# Patient Record
Sex: MALE | Race: WHITE | Employment: OTHER | URBAN - METROPOLITAN AREA
[De-identification: names, ages, dates, MRNs, and addresses within clinical notes are randomized per-mention and may not be internally consistent; named-entity substitution may affect disease eponyms.]

---

## 2017-10-13 ENCOUNTER — HOSPITAL ENCOUNTER (OUTPATIENT)
Dept: LAB | Age: 82
Discharge: HOME | End: 2017-10-13
Payer: MEDICARE

## 2017-10-13 ENCOUNTER — CLINICAL SUPPORT (OUTPATIENT)
Dept: CARDIOLOGY CLINIC | Age: 82
End: 2017-10-13

## 2017-10-13 ENCOUNTER — OFFICE VISIT (OUTPATIENT)
Dept: CARDIOLOGY CLINIC | Age: 82
End: 2017-10-13

## 2017-10-13 VITALS
HEIGHT: 66 IN | WEIGHT: 180 LBS | SYSTOLIC BLOOD PRESSURE: 128 MMHG | BODY MASS INDEX: 28.93 KG/M2 | HEART RATE: 72 BPM | DIASTOLIC BLOOD PRESSURE: 88 MMHG

## 2017-10-13 DIAGNOSIS — I47.29 PAROXYSMAL VENTRICULAR TACHYCARDIA: ICD-10-CM

## 2017-10-13 DIAGNOSIS — E78.2 MIXED HYPERLIPIDEMIA: ICD-10-CM

## 2017-10-13 DIAGNOSIS — I71.21 ASCENDING AORTIC ANEURYSM: Primary | ICD-10-CM

## 2017-10-13 DIAGNOSIS — I77.810 DILATED AORTIC ROOT (HCC): Primary | ICD-10-CM

## 2017-10-13 DIAGNOSIS — I25.10 CORONARY ARTERY DISEASE INVOLVING NATIVE CORONARY ARTERY OF NATIVE HEART WITHOUT ANGINA PECTORIS: ICD-10-CM

## 2017-10-13 PROCEDURE — 80048 BASIC METABOLIC PNL TOTAL CA: CPT

## 2017-10-13 PROCEDURE — 36415 COLL VENOUS BLD VENIPUNCTURE: CPT

## 2017-10-13 RX ORDER — TRAZODONE HYDROCHLORIDE 50 MG/1
TABLET ORAL
COMMUNITY

## 2017-10-13 NOTE — MR AVS SNAPSHOT
Visit Information Date & Time Provider Department Dept. Phone Encounter #  
 10/13/2017  4:00 PM Kylie Espinal MD [facility] Kacey Gallardo [phone] [id number] Follow-up Instructions Return in about 1 year (around 10/13/2018). Your Appointments 10/13/2017  4:00 PM  
ESTABLISHED PATIENT with Kylie Espinal MD  
[facility] ([facility]) Appt Note: Per Dr. Natalia Reyes Echo @1 dx aortic aneurysm, See Latanya Mosley after pts wife r/s from 08/11 to 08/22. .. pt's wife r/s frrom 8/22 to 9/5 pts wife rs'd 9/5 to 9/29 kmr r/s from 9/29; Per Dr. Natalia Cavazos @6 dx aortic aneurysm, See Latanya Melany after pts wife r/s from 08/11 to 08/22. .. pt's wife r/s frrom 8/22 to 9/5 pts wife rs'd 9/5 to 9/29 kmr r/s from 9/29 wife r/s from 10/10  
 [facility] [address] [place] [address] [address] [phone] Upcoming Health Maintenance Date Due DTaP/Tdap/Td series (1 - Tdap) 2/28/1957 ZOSTER VACCINE AGE 60> 12/28/1995 GLAUCOMA SCREENING Q2Y 2/28/2001 Pneumococcal 65+ Low/Medium Risk (1 of 2 - PCV13) 2/28/2001 MEDICARE YEARLY EXAM 2/28/2001 INFLUENZA AGE 9 TO ADULT 8/1/2017 Allergies as of 10/13/2017  Review Complete On: 10/13/2017 By: Kylie Espinal MD  
  
 Severity Noted Reaction Type Reactions Sulfa (Sulfonamide Antibiotics)  09/28/2010   Side Effect Unknown (comments) Does not remember Current Immunizations  Never Reviewed No immunizations on file. Not reviewed this visit Vitals BP Pulse Height(growth percentile) Weight(growth percentile) BMI Smoking Status 128/88 72 5' 5.5\" (1.664 m) 180 lb (81.6 kg) 29.5 kg/m2 Never Smoker Vitals History BMI and BSA Data Body Mass Index Body Surface Area  
 29.5 kg/m 2 1.94 m 2 Preferred Pharmacy Pharmacy Name Phone [phone] Your Updated Medication List  
  
   
This list is accurate as of: 10/13/17  3:38 PM.  Always use your most recent med list.  
  
  
  
  
 acetaminophen 325 mg tablet Commonly known as:  TYLENOL Take  by mouth as needed for Pain. allopurinol 300 mg tablet Commonly known as:  Karlene Bolus Take 300 mg by mouth two (2) times a day. amLODIPine 5 mg tablet Commonly known as:  Horris Bills Take 5 mg by mouth daily. aspirin 81 mg tablet Take  by mouth daily. LIPITOR 20 mg tablet Generic drug:  atorvastatin Take  by mouth daily. LOTENSIN 40 mg tablet Generic drug:  benazepril Take 40 mg by mouth daily. metoprolol succinate 25 mg XL tablet Commonly known as:  TOPROL XL Take 1 Tab by mouth daily. PriLOSEC 20 mg capsule Generic drug:  omeprazole Take 20 mg by mouth daily. traZODone 50 mg tablet Commonly known as:  Madolyn Saas Take  by mouth nightly. warfarin 5 mg tablet Commonly known as:  COUMADIN Take 5 mg by mouth daily. Per Dr. Maykel Beebe office Follow-up Instructions Return in about 1 year (around 10/13/2018). Introducing [facility]! Yani Mortensen introduces [facility] patient portal. Now you can access parts of your medical record, email your doctor's office, and request medication refills online. 1. In your internet browser, go to [email or web address] 2. Click on the First Time User? Click Here link in the Sign In box. You will see the New Member Sign Up page. 3. Enter your [facility] Access Code exactly as it appears below. You will not need to use this code after youve completed the sign-up process. If you do not sign up before the expiration date, you must request a new code. · [facility] Access Code: [email or web address] Expires: 1/11/2018  3:38 PM 
 
4.  Enter the last four digits of your Social Security Number (xxxx) and Date of Birth (mm/dd/yyyy) as indicated and click Submit. You will be taken to the next sign-up page. 5. Create a Pharmacopeia ID. This will be your Pharmacopeia login ID and cannot be changed, so think of one that is secure and easy to remember. 6. Create a [employer]ia password. You can change your password at any time. 7. Enter your Password Reset Question and Answer. This can be used at a later time if you forget your password. 8. Enter your e-mail address. You will receive e-mail notification when new information is available in [address] 9. Click Sign Up. You can now view and download portions of your medical record. 10. Click the Download Summary menu link to download a portable copy of your medical information. If you have questions, please visit the Frequently Asked Questions section of the Pharmacopeia website. Remember, [employer]ia is NOT to be used for urgent needs. For medical emergencies, dial 911. Now available from your iPhone and Android! Please provide this summary of care documentation to your next provider. Your primary care clinician is listed as Heidi Neumann. If you have any questions after today's visit, please call [phone].

## 2017-10-13 NOTE — PROGRESS NOTES
HISTORY OF PRESENT ILLNESS  Hermelinda Pelaez is a 81 y.o. male     SUMMARY:   Problem List  Date Reviewed: 10/13/2017          Codes Class Noted    CAD (coronary artery disease), native coronary artery ICD-10-CM: I25.10  ICD-9-CM: 414.01  5/1/2011    Overview Addendum 5/1/2011  7:09 PM by Fifi Boyd MD     Mr. Robin Mora presented in transfer from a [facility] in September, 2000, with exercise induced ventricular tachycardia and abnormal stress test. He underwent cardiac catheterization which showed significant disease involving a small obtuse marginal branch that was not amenable to angioplasty or surgery. The right coronary had a 50% mid-stenosis in it and the LAD was okay. Overall ejection fraction was 50% with mild anterolateral hypokinesis. He subsequently underwent electrophysiologic testing on 9/13/00 that showed some inducible atrial flutter but no ventricular arrhythmias. 3/10 echocardiogram at [facility] normal lv size and function mild tr aortic root 4.2cm             Mixed hyperlipidemia ICD-10-CM: E78.2  ICD-9-CM: 272.2  9/28/2010        Paroxysmal ventricular tachycardia (HCC) ICD-10-CM: I47.2  ICD-9-CM: 427.1  9/28/2010        Abdominal aneurysm without mention of rupture ICD-10-CM: I71.4  ICD-9-CM: 441.4  9/28/2010              Current Outpatient Prescriptions on File Prior to Visit   Medication Sig    metoprolol succinate (TOPROL XL) 25 mg XL tablet Take 1 Tab by mouth daily.  acetaminophen (TYLENOL) 325 mg tablet Take  by mouth as needed for Pain.  atorvastatin (LIPITOR) 20 mg tablet Take  by mouth daily.  amlodipine (NORVASC) 5 mg tablet Take 5 mg by mouth daily.  aspirin 81 mg tablet Take  by mouth daily.  omeprazole (PRILOSEC) 20 mg capsule Take 20 mg by mouth daily.  warfarin (COUMADIN) 5 mg tablet Take 5 mg by mouth daily. Per Dr. Richard Ocampo office     allopurinol (ZYLOPRIM) 300 mg tablet Take 300 mg by mouth two (2) times a day.     benazepril (LOTENSIN) 40 mg tablet Take 40 mg by mouth daily. No current facility-administered medications on file prior to visit. CARDIOLOGY STUDIES TO DATE:  3/10 ct scan 4.4cm ascending aortic anneursym   5/11 echo ascending aorta 4.6cm  6/13 echo ascending aorta 4.4cm  8/14 echo normal lvef, 4.5cm aortic anneursym  8/15 echo, 4.4 cm ascending aortic anneursym  8/16 echo, 4.3 cm ascending aortic anneursym, trace ai, normal lvef    10/17 echo, 4.4 cm at sinuses, 4.8cm ascending aortic  normal lvef      Chief Complaint   Patient presents with    Coronary Artery Disease     HPI :  Mr. Camille Watson has really slowed down. He is walking with a cane. He planted no garden this year and his wife says that he is basically doing nothing but sitting in a chair and walking around the house from time to time during the day. He is not complaining of any worrisome symptoms that sound cardiac and he cannot explain to me why he is not interested in doing things any longer. There has been a significant change in his aorta this year based on the echocardiogram.        CARDIAC ROS:   negative for chest pain, dyspnea, palpitations, syncope, orthopnea, paroxysmal nocturnal dyspnea, exertional chest pressure/discomfort, claudication, lower extremity edema    Family History   Problem Relation Age of Onset    Stroke Mother     Heart Disease Brother     Cancer Maternal Grandmother     Parkinsonism Neg Hx        Past Medical History:   Diagnosis Date    Abdominal aneurysm without mention of rupture 9/28/2010    Aneurysm ([address]     heart    DVT (deep venous thrombosis) (HCC)     Hearing reduced     Hypertension     TIA (transient ischemic attack)     4 mini strokes less than 2 weeks apart       GENERAL ROS:  A comprehensive review of systems was negative except for that written in the HPI.     Visit Vitals    /88    Pulse 72    Ht 5' 5.5\" (1.664 m)    Wt 180 lb (81.6 kg)    BMI 29.5 kg/m2       Wt Readings from Last 3 Encounters:   10/13/17 180 lb (81.6 kg)   08/11/16 189 lb (85.7 kg)   08/14/15 189 lb (85.7 kg)            BP Readings from Last 3 Encounters:   10/13/17 128/88   08/11/16 134/90   08/14/15 110/70       PHYSICAL EXAM  General appearance: alert, cooperative, no distress, appears stated age  Neck: supple, symmetrical, trachea midline, no adenopathy, no carotid bruit and no JVD  Lungs: clear to auscultation bilaterally  Heart: regular rate and rhythm, S1, S2 normal, no murmur, click, rub or gallop  Extremities: extremities normal, atraumatic, no cyanosis or edema      ASSESSMENT  We had a long talk about the change in the size of the aorta and whether or not he would even consider having aortic surgery if it was clinically indicated. Neither he nor his wife were sure about this. I think we should get a CT angiogram at this point to further define what is going on and see if we really need to consider this. Based on the way he has progressed over the last year or so, I am not sure he would tolerate such a major operation. current treatment plan is effective, no change in therapy  lab results and schedule of future lab studies reviewed with patient  reviewed diet, exercise and weight control    No diagnosis found. Orders Placed This Encounter    traZODone (DESYREL) 50 mg tablet       Follow-up Disposition:  Return in about 1 year (around 10/13/2018).     Adan Najera MD  10/13/2017

## 2017-10-13 NOTE — LETTER
10/16/2017 1:22 PM 
 
Mr. Di Sebastian [address] [id number] Dear Di Sebastian: 
 
Please find your most recent results below. Resulted Orders METABOLIC PANEL, BASIC Result Value Ref Range Glucose 86 65 - 99 mg/dL BUN 26 8 - 27 mg/dL Creatinine 1.64 (H) 0.76 - 1.27 mg/dL GFR est non-AA 39 (L) >59 mL/min/1.73 GFR est AA 45 (L) >59 mL/min/1.73  
 BUN/Creatinine ratio 16 10 - 24 Sodium 145 (H) 134 - 144 mmol/L Potassium 3.5 3.5 - 5.2 mmol/L Chloride 105 96 - 106 mmol/L  
 CO2 23 18 - 29 mmol/L Calcium 9.0 8.6 - 10.2 mg/dL Narrative Performed at:  [address]  [id number] : Pedro Clement MD, Phone:  [phone] CKD REPORT Result Value Ref Range Interpretation Note Comment:  
   Supplement report is available. Narrative Performed at:  [address] [place] [address] [address] [place] [address]  [id number] : Devendra Johnson PhD, Phone:  [phone] RECOMMENDATIONS:  
Your kidney function is a little bit abnormal. I'm not sure if this is old or new since your PCP follows your blood work. You should follow up with your PCP. Please call me if you have any questions: [phone] Sincerely, Sammie Tracy MD

## 2017-10-14 LAB
BUN SERPL-MCNC: 26 MG/DL (ref 8–27)
BUN/CREAT SERPL: 16 (ref 10–24)
CALCIUM SERPL-MCNC: 9 MG/DL (ref 8.6–10.2)
CHLORIDE SERPL-SCNC: 105 MMOL/L (ref 96–106)
CO2 SERPL-SCNC: 23 MMOL/L (ref 18–29)
CREAT SERPL-MCNC: 1.64 MG/DL (ref 0.76–1.27)
GLUCOSE SERPL-MCNC: 86 MG/DL (ref 65–99)
INTERPRETATION: NORMAL
POTASSIUM SERPL-SCNC: 3.5 MMOL/L (ref 3.5–5.2)
SODIUM SERPL-SCNC: 145 MMOL/L (ref 134–144)

## 2017-10-16 NOTE — PROGRESS NOTES
Kidney function is a little bit abnormal, not sure if this is old or new since pcp follows blood work.  Hopefully they will still do cta